# Patient Record
Sex: FEMALE | Race: WHITE
[De-identification: names, ages, dates, MRNs, and addresses within clinical notes are randomized per-mention and may not be internally consistent; named-entity substitution may affect disease eponyms.]

---

## 2017-02-24 ENCOUNTER — HOSPITAL ENCOUNTER (OUTPATIENT)
Dept: HOSPITAL 80 - FIMAGING | Age: 77
End: 2017-02-24
Attending: INTERNAL MEDICINE
Payer: COMMERCIAL

## 2017-02-24 DIAGNOSIS — I48.91: Primary | ICD-10-CM

## 2017-02-24 PROCEDURE — 75572 CT HRT W/3D IMAGE: CPT

## 2017-03-02 ENCOUNTER — HOSPITAL ENCOUNTER (OUTPATIENT)
Dept: HOSPITAL 80 - FCATH | Age: 77
Setting detail: OBSERVATION
LOS: 1 days | Discharge: HOME | End: 2017-03-03
Attending: INTERNAL MEDICINE | Admitting: INTERNAL MEDICINE
Payer: COMMERCIAL

## 2017-03-02 DIAGNOSIS — E66.01: ICD-10-CM

## 2017-03-02 DIAGNOSIS — E78.5: ICD-10-CM

## 2017-03-02 DIAGNOSIS — G47.33: ICD-10-CM

## 2017-03-02 DIAGNOSIS — Z85.3: ICD-10-CM

## 2017-03-02 DIAGNOSIS — I48.0: Primary | ICD-10-CM

## 2017-03-02 DIAGNOSIS — E03.9: ICD-10-CM

## 2017-03-02 DIAGNOSIS — I42.9: ICD-10-CM

## 2017-03-02 LAB
% IMMATURE GRANULYOCYTES: 0.4 % (ref 0–1.1)
ABSOLUTE IMMATURE GRANULOCYTES: 0.03 10^3/UL (ref 0–0.1)
ABSOLUTE NRBC COUNT: 0 10^3/UL (ref 0–0.01)
ADD DIFF?: NO
ADD MORPH?: NO
ADD SCAN?: NO
ANION GAP SERPL CALC-SCNC: 8 MEQ/L (ref 8–16)
APTT BLD: 30.6 SEC (ref 23–38)
ATYPICAL LYMPHOCYTE FLAG: 20 (ref 0–99)
CALCIUM SERPL-MCNC: 9.4 MG/DL (ref 8.5–10.4)
CHLORIDE SERPL-SCNC: 107 MEQ/L (ref 97–110)
CO2 SERPL-SCNC: 25 MEQ/L (ref 22–31)
CREAT SERPL-MCNC: 0.7 MG/DL (ref 0.6–1)
ERYTHROCYTE [DISTWIDTH] IN BLOOD BY AUTOMATED COUNT: 12.7 % (ref 11.5–15.2)
FRAGMENT RBC FLAG: 0 (ref 0–99)
GFR SERPL CREATININE-BSD FRML MDRD: > 60 ML/MIN/{1.73_M2}
GLUCOSE SERPL-MCNC: 101 MG/DL (ref 70–100)
HCT VFR BLD CALC: 38.8 % (ref 38–47)
HGB BLD-MCNC: 12.8 G/DL (ref 12.6–16.3)
INR PPP: 1.36 (ref 0.83–1.16)
LEFT SHIFT FLG: 0 (ref 0–99)
LIPEMIA HEMOLYSIS FLAG: 80 (ref 0–99)
MAGNESIUM SERPL-MCNC: 2 MG/DL (ref 1.6–2.3)
MCH RBC BLDCO QN: 30.5 PG (ref 27.9–34.1)
MCHC RBC AUTO-ENTMCNC: 33 G/DL (ref 32.4–36.7)
MCV RBC AUTO: 92.4 FL (ref 81.5–99.8)
NRBC-AUTO%: 0 % (ref 0–0.2)
PLATELET # BLD: 208 10^3/UL (ref 150–400)
PLATELET CLUMPS FLAG: 0 (ref 0–99)
PMV BLD AUTO: 9.2 FL (ref 8.7–11.7)
POTASSIUM SERPL-SCNC: 4.5 MEQ/L (ref 3.5–5.2)
PROTHROMBIN TIME: 16.8 SEC (ref 12–15)
RBC # BLD AUTO: 4.2 10^6/UL (ref 4.18–5.33)
SODIUM SERPL-SCNC: 140 MEQ/L (ref 134–144)

## 2017-03-02 PROCEDURE — 93656 COMPRE EP EVAL ABLTJ ATR FIB: CPT

## 2017-03-02 PROCEDURE — C1732 CATH, EP, DIAG/ABL, 3D/VECT: HCPCS

## 2017-03-02 PROCEDURE — 93609 INTRA-VNTR MAPG TCHYCAR SITE: CPT

## 2017-03-02 PROCEDURE — C1893 INTRO/SHEATH, FIXED,NON-PEEL: HCPCS

## 2017-03-02 PROCEDURE — C1730 CATH, EP, 19 OR FEW ELECT: HCPCS

## 2017-03-02 PROCEDURE — C1733 CATH, EP, OTHR THAN COOL-TIP: HCPCS

## 2017-03-02 PROCEDURE — C1731 CATH, EP, 20 OR MORE ELEC: HCPCS

## 2017-03-02 PROCEDURE — G0378 HOSPITAL OBSERVATION PER HR: HCPCS

## 2017-03-02 PROCEDURE — 93662 INTRACARDIAC ECG (ICE): CPT

## 2017-03-02 PROCEDURE — 025S3ZZ DESTRUCTION OF RIGHT PULMONARY VEIN, PERCUTANEOUS APPROACH: ICD-10-PCS | Performed by: INTERNAL MEDICINE

## 2017-03-02 PROCEDURE — 93306 TTE W/DOPPLER COMPLETE: CPT

## 2017-03-02 PROCEDURE — C1759 CATH, INTRA ECHOCARDIOGRAPHY: HCPCS

## 2017-03-02 PROCEDURE — 025T3ZZ DESTRUCTION OF LEFT PULMONARY VEIN, PERCUTANEOUS APPROACH: ICD-10-PCS | Performed by: INTERNAL MEDICINE

## 2017-03-02 PROCEDURE — 93005 ELECTROCARDIOGRAM TRACING: CPT

## 2017-03-02 RX ADMIN — GABAPENTIN SCH MG: 300 CAPSULE ORAL at 21:45

## 2017-03-02 RX ADMIN — SOTALOL HYDROCHLORIDE SCH MG: 80 TABLET ORAL at 21:46

## 2017-03-02 NOTE — CPEKG
Heart Rate: 62

RR Interval: 968

P-R Interval: 188

QRSD Interval: 88

QT Interval: 460

QTC Interval: 468

P Axis: 73

QRS Axis: 67

T Wave Axis: 74

EKG Severity - ABNORMAL ECG -

EKG Impression: SINUS RHYTHM

EKG Impression: LEFT VENTRICULAR HYPERTROPHY

Electronically Signed By: David Barajas 02-Mar-2017 15:34:50

## 2017-03-03 VITALS
DIASTOLIC BLOOD PRESSURE: 52 MMHG | OXYGEN SATURATION: 96 % | HEART RATE: 64 BPM | SYSTOLIC BLOOD PRESSURE: 108 MMHG | RESPIRATION RATE: 16 BRPM

## 2017-03-03 VITALS — TEMPERATURE: 97.6 F

## 2017-03-03 LAB
% IMMATURE GRANULYOCYTES: 0.4 % (ref 0–1.1)
ABSOLUTE IMMATURE GRANULOCYTES: 0.03 10^3/UL (ref 0–0.1)
ABSOLUTE NRBC COUNT: 0 10^3/UL (ref 0–0.01)
ADD DIFF?: NO
ADD MORPH?: NO
ADD SCAN?: NO
ANION GAP SERPL CALC-SCNC: 5 MEQ/L (ref 8–16)
ATYPICAL LYMPHOCYTE FLAG: 20 (ref 0–99)
CALCIUM SERPL-MCNC: 8.6 MG/DL (ref 8.5–10.4)
CHLORIDE SERPL-SCNC: 107 MEQ/L (ref 97–110)
CK-MB INTERPRETATION: POSITIVE
CO2 SERPL-SCNC: 23 MEQ/L (ref 22–31)
CREAT SERPL-MCNC: 0.6 MG/DL (ref 0.6–1)
CREATINE KINASE-MB FRACTION: 23.8 NG/ML (ref 0–3.19)
ERYTHROCYTE [DISTWIDTH] IN BLOOD BY AUTOMATED COUNT: 12.7 % (ref 11.5–15.2)
FRAGMENT RBC FLAG: 0 (ref 0–99)
GFR SERPL CREATININE-BSD FRML MDRD: > 60 ML/MIN/{1.73_M2}
GLUCOSE SERPL-MCNC: 117 MG/DL (ref 70–100)
HCT VFR BLD CALC: 33.8 % (ref 38–47)
HGB BLD-MCNC: 11.2 G/DL (ref 12.6–16.3)
INR PPP: 1.29 (ref 0.83–1.16)
LEFT SHIFT FLG: 0 (ref 0–99)
LIPEMIA HEMOLYSIS FLAG: 80 (ref 0–99)
MCH RBC BLDCO QN: 30.4 PG (ref 27.9–34.1)
MCHC RBC AUTO-ENTMCNC: 33.1 G/DL (ref 32.4–36.7)
MCV RBC AUTO: 91.8 FL (ref 81.5–99.8)
NRBC-AUTO%: 0 % (ref 0–0.2)
PLATELET # BLD: 174 10^3/UL (ref 150–400)
PLATELET CLUMPS FLAG: 30 (ref 0–99)
PMV BLD AUTO: 9.3 FL (ref 8.7–11.7)
POTASSIUM SERPL-SCNC: 4.5 MEQ/L (ref 3.5–5.2)
PROTHROMBIN TIME: 16.1 SEC (ref 12–15)
RBC # BLD AUTO: 3.68 10^6/UL (ref 4.18–5.33)
SODIUM SERPL-SCNC: 135 MEQ/L (ref 134–144)
TROPONIN I SERPL-MCNC: 4.82 NG/ML (ref 0–0.03)

## 2017-03-03 RX ADMIN — ENOXAPARIN SODIUM SCH MG: 100 INJECTION SUBCUTANEOUS at 01:02

## 2017-03-03 RX ADMIN — SOTALOL HYDROCHLORIDE SCH MG: 80 TABLET ORAL at 08:29

## 2017-03-03 RX ADMIN — ENOXAPARIN SODIUM SCH MG: 100 INJECTION SUBCUTANEOUS at 12:31

## 2017-03-03 RX ADMIN — GABAPENTIN SCH MG: 300 CAPSULE ORAL at 08:29

## 2017-03-03 NOTE — GDS
ADMISSION DIAGNOSES:  

1.  Paroxysmal atrial fibrillation.

2.  Paroxysmal atrial flutter.

3.  Previous history of nonischemic cardiomyopathy with previous improvement of medication.

4.  Obstructive sleep apnea.

5.  Hypothyroidism.

6.  Previous history of breast cancer.



DISCHARGE DIAGNOSES:  

1.  Paroxysmal atrial fibrillation.

2.  Status post electrophysiology and pulmonary vein isolation, atrial fibrillation ablation with cr
yoballoon technique.

3.  Paroxysmal atrial flutter with previous ablation.

4.  History of nonischemic dilated cardiomyopathy with improvement with medication therapy.

5.  Hyperthyroidism.

6.  Hyperlipidemia.

7.  Obstructive sleep apnea.

8.  Previous history of breast cancer.



PROCEDURES DURING HOSPITALIZATION:  

1.  Electrocardiogram.

2.  Echocardiogram.

3.  Electrophysiology study.

4.  Pulmonary vein isolation with cryoballoon technique for atrial fibrillation ablation.



BRIEF HISTORY:  Please see H and P.  The patient is a 76-year-old female who was noted to have atria
l flutter in the past with previous ablation with recurring atrial fibrillation.  She has recently u
ndergone knee replacement and during rehab has been noted to have significantly more episodes of atr
ial fibrillations.  During her fibrillation, she reports less fatigue, shortness of breath.  She is 
currently on antiarrhythmic therapy of sotalol, but continues to have episodes of AFib.  She was see
n by Dr. Drew for evaluation and felt to be a good candidate for possible atrial fibrillation with c
onsideration of switching atrial fibrillation ablation or attempting a different antiarrhythmic ther
apy.  Patient first chose to proceed on with atrial fibrillation.



HOSPITAL COURSE:  The patient was admitted to the [facility] and prepped for procedure and taken to the [facility]
ctrophysiology lab there.  EP procedure was done.  Paroxysmal atrial fibrillation was found.  At finn
t point, pulmonary vein isolation procedure with cryoballoon ablation for atrial fibrillation ablati
on was done successfully with no complications.  The patient was ultimately taken back to ICU for ov
ernight observation.  There, she reports that she has felt mildly fatigued, but denies of any chest 
pain or shortness of breath.  She has been up and walking the unit with no difficulties.  She has ha
d no bleeding complications at her groin site.  She has remained in sinus rhythm.



PHYSICAL EXAMINATION TODAY:  GENERAL APPEARANCE:  Medium built, well-groomed,  female.  She
 is alert and oriented to person, place, time, and situation.  Appears to be under no acute distress
.  CURRENT VITAL SIGNS:  Blood pressure of 108/52, heart rate of 64, respirations 16, saturating 96%
 on room air, temperature of 36.4 degrees Celsius.  HEENT:  Head is normocephalic.  Lips and tongue 
are pink and moist with no signs of cyanosis.  Conjunctivae pink.  NECK:  Trachea is midline, +2 car
otid pulses bilaterally, no auscultated bruits, no jugular vein distention.  RESPIRATORY:  Lungs myesha
ar to auscultation.  No rhonchi, rales or wheezes.  No accessory muscle use, no intercostal muscle r
etraction noted.  CARDIAC:  Regular rate, regular rhythm, S1, S2, no S3, S4, gallops rubs or murmurs
 noted.  ABDOMEN:  Soft, nontender, normoactive bowel sounds x4 quadrants, no organomegaly, no palpa
ble masses.  SKIN:  Pink, warm, dry, no cyanosis, no clubbing, no peripheral edema.  VASCULAR:  +2 c
arotids bilateral, +2 radials bilateral, +1 dorsal pedal and posterior tibial pulses bilateral.  Cat
heter insertion site:  Bilateral groin sites, no redness, swelling, drainage, ecchymosis, or hematom
a noted.  No auscultated bruit noted over either site.  NEURO:  Cranial nerves II through XII grossl
y intact.



LABORATORY STUDIES TODAY:  WBC 8.35, hemoglobin 11.2, hematocrit of 33.8, platelet count 174.  INR 1
.29. 

Sodium 135, potassium 4.5, chloride 107, CO2 23, BUN 14, creatinine 0.6, glucose 117. 

Calcium 8.6, CK of 391, CK-MB fraction 23.8, CK-MB percentage 6.1, troponin 4.820. 

Note:  Expected elevated cardiac enzymes status post cryoballoon ablation for atrial fibrillation.



PROCEDURES:  

1.  Electrophysiology study, as mentioned above.  

2.  Ablation procedure, as mentioned above. 

3.  Electrocardiogram done today shows sinus rhythm, normal axis, no significant ST or T-wave abnorm
alities suggestive of ischemia.

4.  Echocardiogram done today shows normal LV wall motion with normal ejection fraction, no signific
ant pericardial effusion noted (preliminary report).



DISCHARGE DISPOSITION:  Patient will be discharged home in stable condition.



DISCHARGE ACTIVITIES:  She is under activity restrictions of no strenuous activities, not lifting mo
re than 10 pounds for the next week, and no strenuous activity for the next 2 weeks.



DISCHARGE MEDICATIONS:  Please see discharge medication reconciliation sheet. 



Note, patient will continue on Lovenox therapy at 1 mg/kg every 12 hours until Sunday, along with ta
wes her warfarin.  We plan on having an INR drawn on Monday, with evaluation with our Coumadin nurs
e if necessary.  Will continue her on Coumadin therapy into her INR remains therapeutic at 1.9 or gr
eater.  



She continues on current home dose of sotalol.  The patient has also been started on Protonix 40 mg 
daily which she has been advised that she needs to take for the next 6 weeks.



DISCHARGE INSTRUCTIONS:  Post-atrial fibrillation discharge instructions were gone over with the pat
stella and her , including monitoring for signs of infection, bleeding precautions, activity re
strictions, medication compliance, and followup.  Both of them verbalized understanding.  At the rex
e of discharge, neither one has any further questions.  They have been told that if any problems or 
concerns come up postdischarge, they are to call our office or seek medical attention immediately.



FOLLOWUP:  The patient has a followup appointment with Dr. Drew in 2 weeks, sooner if necessary.



TIME SPENT:  Total time spent on discharge greater than 30 minutes.





Job #:  [id number]

## 2017-03-03 NOTE — ECHO
[id number]

[id number]



+---------------------------------------------------+      [address]

:                                                   :       [place] [address]

:                                                   :           [phone]

+---------------------------------------------------+       Fax [phone]



                       Adult Echocardiographic Report



+----------------------------------------------------------------------------

---------+

:Name: BERTHA JAIN Danikamarion Date: 2017 07:31 AM                    

         :

:MRN: [id number]          Hospital Admission Number: [id number] Milli

georgieon: 245:

:: 1940          Gender: Female                         Height: 67 i

n        :

:Age: 76 yrs              Race: WH,White                         Weight: 173 

lb       :

:Reason For Study: S/P ablation                                              

         :

:                                                                BSA: 1.9 met

ers2     :

+----------------------------------------------------------------------------

---------+

MMode/2D Measurements \T\ Calculations

IVSd: 0.84 cm       LVIDd: 3.9 cm      FS: 41.3 %        Ao root diam:

LVPWd: 0.70 cm      LVIDs: 2.3 cm      EDV(Teich):       3.4 cm

                                       66.0 ml           LA dimension:

                                       ESV(Teich):       4.0 cm

                                       17.9 ml

                                       EF(Teich): 72.8 %

        _____________________________________________________________



LVLd ap4: 7.4 cm    SV(MOD-sp4):

EDV(MOD-sp4):       38.0 ml

52.0 ml

LVLs ap4: 5.8 cm

ESV(MOD-sp4):

14.0 ml

EF(MOD-sp4): 73.1 %



Normal Measurement Values:

+----------------------------------------------------------------------------

----------------------------------+

:LVIDd (3.5-5.7cm)    IVSd (0.6-1.1cm)     LVPWd (0.6-1.1cm)     Aortic Root 

(2.0-3.7cm)Left Atrium (1.5-4.0cm):

:LV Vol(d) (76-115ml) LV Vol(s) (29-48ml)  Ejec Fraction (50-65%)PV Chago (0.6-

1.2m/s)    TV Chago (0.4-1.0m/s)    :

:MV E Chago (0.8-1.0m/s)MV A Chago (0.3-1.0m/s)LVOT Chago (0.7-1.2m/s) Asc Ao Chago (

0.9-1.8m/s)                       :

+----------------------------------------------------------------------------

----------------------------------+

Doppler Measurements \T\ Calculations

MV E max chago: 96.3 cm/sec  Ao V2 max: 97.7 cm/sec TR max chago: 247.0 cm/sec

MV A max chago: 49.4 cm/sec  Ao max PG: 3.8 mmHg    TR max P.4 mmHg

MV E/A: 1.9                                       RAP systole: 5.0 mmHg

                                                  RVSP(TR): 29.4 mmHg



Left Ventricle

The left ventricle is normal in size and function. There is normal left

ventricular wall thickness. Left ventricular systolic function is normal.

Ejection Fraction = 65-70%. There is Doppler evidence for diastolic

dysfunction. No regional wall motion abnormalities noted.





Right Ventricle

The right ventricle is normal in size and function.



Atria

The left atrium is mildly dilated. Right atrial size is normal. Transseptal

puncture flow visualized.





Mitral Valve

The mitral valve is normal in structure and function. There is no evidence

of mitral valve prolapse. There is no mitral valve stenosis. There is mild

mitral regurgitation.



Tricuspid Valve

Normal tricuspid valve. There is mild tricuspid regurgitation. Right

ventricular systolic pressure is normal.



Aortic Valve

The aortic valve is trileaflet. The aortic valve opens well. There is no

aortic stenosis. There is no aortic insufficiency.



Pulmonic Valve

The pulmonic valve is normal in structure and function. There is no pulmonic

valvular regurgitation.



Great Vessels

The aortic root is normal size.





Pericardium/Pleural

There is no pericardial effusion.



Conclusion

A complete two-dimensional transthoracic echocardiogram was performed (2D,

M-mode, Doppler and color flow Doppler).

The left ventricle is normal in size and function.

Left ventricular systolic function is normal.

Ejection Fraction = 65-70%.

There is Doppler evidence for diastolic dysfunction.

The left atrium is mildly dilated.

There is mild mitral regurgitation.

There is mild tricuspid regurgitation.

Right ventricular systolic pressure is normal.

Transseptal puncture flow visualized.

There is no pericardial effusion.



Other than expected flow across the interatrial septum post procedure, no

siginificant change compared with 2016



_____________________________________________________________________________







Final Reading Physician:

                        Dr Mariya Wang  electronically signed on 2017

                        10:30 AM

Ordering Physician: Jonas Drew

Performed By: Sudha Hill, GINA

## 2017-03-03 NOTE — CPEKG
Heart Rate: 69

RR Interval: 870

P-R Interval: 192

QRSD Interval: 90

QT Interval: 452

QTC Interval: 485

P Axis: 82

QRS Axis: 78

T Wave Axis: 69

EKG Severity - NORMAL ECG -

EKG Impression: SINUS RHYTHM

Electronically Signed By: David Barajas 03-Mar-2017 14:36:22

## 2017-03-03 NOTE — EPPROC
Electrophysiology Procedure Note: 





ELECTROPHYSIOLOGIC STUDY AND BALLOON-CATHETER MEDIATED CRYOABLATION FOR   ____  

ATRIAL FIBRILLATION 








Procedures performed:





50404-51    EP evaluation with RA/RV/LA pace/record, with arrhythmia induction





99748-04    EP evaluation with RA/RV pace record, insert/reposition catheter, 

with arrhythmia induction





90894       Atrial fibrillation ablation





Intracardiac echocardiogram





Transseptal puncture





Fluoroscopy








INDICATION:





Paroxysmal atrial fibrillation 





PROCEDURE:





The patient arrived in the Electrophysiology Laboratory in the fasting state.  

The right groin, left groin and right infraclavicular area were prepped and 

draped in the usual sterile fashion.  Anesthesiologist administered general 

anesthesia .  





All catheters were placed percutaneously using the Seldinger technique and 

advanced into position under fluoroscopic guidance.  One #7 Angolan deflectable 

octapolar electrode catheter was placed in the His-bundle position via the left 

femoral vein (2mm spacing, IVC electrode for unipolar recordings).  This 

catheter was placed in the coronary sinus after transseptal puncture but then 

due to recurrent dislodgement it had to be moved to RA and later placed in the 

SVC-R subclavian vein junction to pace the right phrenic nerve during right 

pulmonary vein ablation. One #8 Angolan AcuNaV ultrasound catheter was placed in 

the left femoral vein and advanced into the right atrium. One #4 Angolan sheath 

was inserted into the left femoral artery via percutaneous technique and used 

for continuous arterial blood pressure monitoring and intermittent ACT 

determination.   





Programmed stimulation was performed from the right atrium, left atrium (CS) 

and right ventricle.   There was no evidence of AV accessory pathway.





Intracardiac echo evaluation of the left atrium and pulmonary veins was 

performed.  





Baseline ACT was drawn and heparin bolus was administered and heparin drip was 

started prior to transseptal puncture. ACT was checked every 15 minutes and 

maintained in the range of 350-400 seconds.  





One 14Fr short sheath was placed in the right femoral vein.  One 8Fr SL1 sheath 

was advanced into the right atrium via the 14Fr short sheath.  Transseptal 

puncture was performed under intracardiac ultrasound, fluoroscopic and 

hemodynamic guidance placing the sheath into the left atrium. [place] RF needle (

C0 curve) was used.  The mean left atrial pressure was 8 mmHg.     





Pulmonary vein angiogram was done using SL1 sheath.   CT angiography of 

pulmonary veins was done previously.  There were distinct LSPV, LIPV, RSPV and 

RIPV.





The SL1 sheath was exchanged for a Medtronic Flexcath sheath using an Amplatz 

stiff guide wire.   A 28 mm Cryoballoon catheter with a 20 mm Achieve catheter 

was placed via the sheath into the left atrium.





Intracardiac ultrasound  and PV angiograms were used to assist in placing the 

mapping catheter at the antrum of the pulmonary veins. 





All pulmonary veins were isolated successfully using cryoballoon ablation using 

freeze/thaw/freeze cycles at 2-3-minute intervals, with good time-to-effect of 

isolation. Coumadin ridge/Ligament of Jones region was ablated. Pre and post 

pulmonary vein recordings were measured on the spiral Achieve catheter to 

ensure complete pulmonary vein isolation. During the right-sided ablation, 

phrenic nerve pacing was performed to assess the phrenic nerve strength (

manually and with ICE visualization of liver movement during phrenic capture) 

and the phrenic nerve was intact throughout the right-sided ablation and at the 

end of the procedure.





An esophageal temperature probe (12 electrode, Circa) was placed by the 

anesthesiologist at the beginning of the procedure.  Esophageal temperature was 

monitored continuously and cryoablation was interrupted if esophageal 

temperature was <15 C.  





Cryoapplications 9,  total cryoablation time 1485.  (LSPV 2 lesions (-44 

degrees and 240sec), LIPV 3 lesions (-44 degrees and 240sec), RSPV 2 lesions (-

51 degrees and 180sec) RIPV 2 lesions (-44 degrees and 240 sec).  RSPV was 

difficult to access but good occlusion and temperatures were achieved


   


 There was no spontaneous atrial fibrillation.  Mapping of all 4 pulmonary 

veins after isoproterenol infusion showed that all 4 pulmonary veins remained 

isolated. 





ICE imaging post ablation was consistent with pre ablation imaging with no 

changes noted, moreover there was no left atrial/left ventricular thrombus and 

no pericardial effusion.





The catheters were withdrawn. Protamine was given.  The sheaths were removed 

and manual pressure was used for hemostasis.   The patient was recovered from 

anesthesia.  There were no complications.   The patient was arousable and 

moving all four extremities at the end of the procedure.





Results:





Successful isolation of all 4 veins. 











CONCLUSIONS:


* Paroxysmal atrial fibrillation.  


* Successful pulmonary vein isolation procedure (left and right pulmonary vein 

antrum) using cryoballoon ablation.


* No apparent complications. 














Patient Problems: 


 Problems











Problem Status Onset


 


Primary localized osteoarthritis of left knee Acute

## 2017-09-23 ENCOUNTER — HOSPITAL ENCOUNTER (OUTPATIENT)
Dept: HOSPITAL 80 - FIMAGING | Age: 77
End: 2017-09-23
Attending: PSYCHIATRY & NEUROLOGY
Payer: COMMERCIAL

## 2017-09-23 DIAGNOSIS — M51.36: ICD-10-CM

## 2017-09-23 DIAGNOSIS — R47.81: ICD-10-CM

## 2017-09-23 DIAGNOSIS — M50.323: ICD-10-CM

## 2017-09-23 DIAGNOSIS — G81.94: Primary | ICD-10-CM

## 2017-09-23 DIAGNOSIS — M50.223: ICD-10-CM

## 2017-09-23 DIAGNOSIS — M50.321: ICD-10-CM

## 2017-09-23 DIAGNOSIS — M51.37: ICD-10-CM

## 2017-09-23 DIAGNOSIS — M21.372: ICD-10-CM

## 2018-09-19 ENCOUNTER — HOSPITAL ENCOUNTER (OUTPATIENT)
Dept: HOSPITAL 80 - FCATH | Age: 78
Discharge: HOME | End: 2018-09-19
Attending: INTERNAL MEDICINE
Payer: COMMERCIAL

## 2018-09-19 DIAGNOSIS — G12.21: ICD-10-CM

## 2018-09-19 DIAGNOSIS — Z79.01: ICD-10-CM

## 2018-09-19 DIAGNOSIS — I48.0: Primary | ICD-10-CM

## 2018-09-19 LAB
INR PPP: 3.4 (ref 0.83–1.16)
PROTHROMBIN TIME: 34.1 SEC (ref 12–15)

## 2018-09-19 PROCEDURE — 5A2204Z RESTORATION OF CARDIAC RHYTHM, SINGLE: ICD-10-PCS | Performed by: INTERNAL MEDICINE

## 2018-09-19 NOTE — PDCARD
Cardioversion Procedure


Procedure: 


electrical cardioversion





Indications: atrial fibrillation


Consent: signed and in chart


Anticoagulation: warfarin


Procedural Details: 


Pads were placed in anterior-posterior position.





Synchronized cardioversion attempt #1: 200J


Results: normal sinus rhythm


Conclusions: successful cardioversion


Patient Problems: 


 Problems











Problem Status Onset


 


Primary localized osteoarthritis of left knee Acute

## 2018-09-19 NOTE — PDANEPAE
ANE History of Present Illness





D/C CV for a-fib





ANE Past Medical History





- Cardiovascular History


Hx Hypertension: No


Hx Arrhythmias: Yes


Hx Chest Pain: No


Hx Coronary Artery / Peripheral Vascular Disease: No


Cardiovascular History Comment: ablation 1-16 and Betapace now for Atrial 

flutter





- Pulmonary History


Hx COPD: No


Hx Asthma/Reactive Airway Disease: No


Hx Recent Upper Respiratory Infection: No


Hx Oxygen in Use at Home: Yes


Hx Sleep Apnea: Yes


Pulmonary History Comment: ELIER uses CPAP and O2 at 2L





- Neurologic History


Hx Cerebrovascular Accident: No


Hx Seizures: No


Hx Dementia: No


Neurologic History Comment: relative advanced ALS





- Endocrine History


Hx Diabetes: No





- Renal History


Hx Renal Disorders: No





- Liver History


Hx Hepatic Disorders: No





- Neurological & Psychiatric Hx


Hx Neurological and Psychiatric Disorders: Yes


Neurological / Psychiatric History Comment: many falls due to neuropathy of 

feet. trazedone for sleep.





- Cancer History


Hx Cancer: Yes


Cancer History Comment: breast





- Congenital Disorder History


Hx Congenital Disorders: No





- GI History


Hx Gastrointestinal Disorders: No


Gastrointestinal History Comment: lasctose intolerant -prone to diarrhea





- Other Health History


Other Health History: OA-left knee, fx L wrist after fall early Oct '16, 

bruises easily





- Chronic Pain History


Chronic Pain: Yes (L knee)





- Surgical History


Prior Surgeries: L meniscus repair, ablation of heart, bunionectomy, mastectomy 

- bilat 90's





ANE Review of Systems


Review of Systems: 








- Exercise capacity


METS (RN): 2 METS





ANE Patient History





- Allergies


Allergies/Adverse Reactions: 








Penicillins Allergy (Verified 03/02/17 12:17)


 Other-Enter Comments








- Home Medications


Home medications: home medication list seen and reviewed


Home Medications: 








Cholecalciferol Vit D3 [Vitamin D3 (*)] 2,000 units PO DAILY 01/06/16 [Last 

Taken 03/01/17]


Herbals/Supplements -Info Only 1 ea PO DAILY 01/06/16 [Last Taken 03/01/17]


traZODone [traZODONE 100MG (*)] 100 mg PO HS 01/06/16 [Last Taken 03/01/17]


Levothyroxine [Synthroid 88 mcg (*)] 88 mcg PO DAILY06 11/14/16 [Last Taken 03/ 01/17]


Acetaminophen [Tylenol 325mg (*)] 325 mg PO DAILY PRN 12/16/16 [Last Taken 03/01 /17]


MIRTAZAPINE  08/16/18 [Last Taken Unknown]








- NPO status


NPO Status: no food or drink >8 hours





- Smoking Hx


Smoking Status: Never smoked





- Alcohol Use


Alcohol Use: None





- Family Anes Hx


Family Anes Hx: none





ANE Labs/Vital Signs





- Labs


Result Diagrams: 


 09/19/18 10:35





- Vital Signs


Height: 170 cm


Weight: 79.4 kg





ANE Physical Exam





- Airway


Neck exam: FROM


Mallampati Score: Class 2


Mouth exam: normal dental/mouth exam





- Pulmonary


Pulmonary: no respiratory distress





- Cardiovascular


Cardiovascular: regular rate and rhythym





- ASA Status


ASA Status: IV





ANE Anesthesia Plan


Anesthesia Plan: GA with mask


Total IV Anesthesia: Yes

## 2018-09-19 NOTE — POSTANESTH
Post Anesthetic Evaluation


Cardiovascular Status: Normal, Stable


Respiratory Status: Normal, Stable, Tx Decrease in SpO2


Level of Consciousness/Mental Status: Can Participate in Eval


Pain Control: Adequate, Prn Tx Ordered


Nausea/Vomiting Control: Adequate, Prn Tx Ordered


Complications Possibly Related to Anesthesia: None Noted

## 2018-09-20 NOTE — CPEKG
Test Reason : OPEN

Blood Pressure : ***/*** mmHG

Vent. Rate : 071 BPM     Atrial Rate : 072 BPM

   P-R Int : 216 ms          QRS Dur : 090 ms

    QT Int : 392 ms       P-R-T Axes : 005 000 028 degrees

   QTc Int : 426 ms

 

Sinus rhythm

Borderline prolonged MT interval

Sinus rhythm with first degree AVB has replaced atrial fibrillation noted on prior ECG

 

Confirmed by Luis Armando Rockwell ([id number]) on 9/20/2018 12:05:03 PM

 

Referred By:             Confirmed By:Luis Armando Rockwell

## 2018-09-20 NOTE — CPEKG
Test Reason : OPEN

Blood Pressure : ***/*** mmHG

Vent. Rate : 139 BPM     Atrial Rate : 288 BPM

   P-R Int : 066 ms          QRS Dur : 086 ms

    QT Int : 353 ms       P-R-T Axes : 000 085 061 degrees

   QTc Int : 537 ms

 

Atrial flutter with predominant 2:1 AV block

Borderline right axis deviation

Low voltage, extremity leads

ST depression, probably rate related

Prolonged QT interval

Atrial flutter has replaced normal sinus rhythm noted on prior ECG

 

Confirmed by Luis Armando Rockwell ([id number]) on 9/20/2018 12:00:23 PM

 

Referred By:             Confirmed By:Luis Armando Rockwell